# Patient Record
Sex: FEMALE | Race: WHITE | Employment: FULL TIME
[De-identification: names, ages, dates, MRNs, and addresses within clinical notes are randomized per-mention and may not be internally consistent; named-entity substitution may affect disease eponyms.]

---

## 2017-10-29 ENCOUNTER — HEALTH MAINTENANCE LETTER (OUTPATIENT)
Age: 32
End: 2017-10-29

## 2020-07-07 ENCOUNTER — OFFICE VISIT (OUTPATIENT)
Dept: FAMILY MEDICINE | Facility: CLINIC | Age: 35
End: 2020-07-07
Payer: COMMERCIAL

## 2020-07-07 VITALS
DIASTOLIC BLOOD PRESSURE: 80 MMHG | OXYGEN SATURATION: 99 % | TEMPERATURE: 98.4 F | HEART RATE: 61 BPM | SYSTOLIC BLOOD PRESSURE: 124 MMHG

## 2020-07-07 DIAGNOSIS — H93.8X2 POPPING OF LEFT EAR: ICD-10-CM

## 2020-07-07 DIAGNOSIS — H93.8X2 PRESSURE SENSATION IN LEFT EAR: ICD-10-CM

## 2020-07-07 DIAGNOSIS — H66.002 NON-RECURRENT ACUTE SUPPURATIVE OTITIS MEDIA OF LEFT EAR WITHOUT SPONTANEOUS RUPTURE OF TYMPANIC MEMBRANE: ICD-10-CM

## 2020-07-07 DIAGNOSIS — H92.02 LEFT EAR PAIN: Primary | ICD-10-CM

## 2020-07-07 RX ORDER — CEFDINIR 300 MG/1
300 CAPSULE ORAL 2 TIMES DAILY
Qty: 10 CAPSULE | Refills: 0 | Status: SHIPPED | OUTPATIENT
Start: 2020-07-07 | End: 2020-08-11

## 2020-07-07 RX ORDER — NEOMYCIN SULFATE, POLYMYXIN B SULFATE, HYDROCORTISONE 3.5; 10000; 1 MG/ML; [USP'U]/ML; MG/ML
2 SOLUTION/ DROPS AURICULAR (OTIC) 4 TIMES DAILY
Qty: 10 ML | Refills: 0 | Status: SHIPPED | OUTPATIENT
Start: 2020-07-07 | End: 2020-08-11

## 2020-07-07 ASSESSMENT — PAIN SCALES - GENERAL: PAINLEVEL: MODERATE PAIN (4)

## 2020-07-07 NOTE — PROGRESS NOTES
HPI       Nanette Estrada is a 35 year old woman who presents with left ear pain, popping and pressure. This started 3-4 days ago. Nanette has tried analgesia and just time to pass and her symptoms have not improved.  She presents for exam and evaluation.   Chief Complaint   Patient presents with     Ear Problem     Discuss popping and pain on L ear.      Problem, Medication and Allergy Lists were reviewed and updated if needed.    Patient is an established patient of this clinic.         Review of Systems:   Review of Systems     Constitutional:  Negative for fever, chills and fatigue.   HENT:  Positive for ear pain.                Physical Exam:     Vitals:    07/07/20 1009   BP: 124/80   Pulse: 61   Temp: 98.4  F (36.9  C)   TempSrc: Oral   SpO2: 99%     There is no height or weight on file to calculate BMI.  Vitals were reviewed and were normal     Physical Exam  Constitutional:       Appearance: Normal appearance.   HENT:      Head: Normocephalic.      Right Ear: Hearing, tympanic membrane, ear canal and external ear normal.      Left Ear: Hearing and external ear normal. Drainage, swelling and tenderness present. Tympanic membrane is erythematous.   Musculoskeletal: Normal range of motion.   Skin:     General: Skin is warm and dry.   Neurological:      General: No focal deficit present.      Mental Status: She is alert and oriented to person, place, and time.   Psychiatric:         Mood and Affect: Mood normal.         Behavior: Behavior normal.         Results:      Results from the last 24 hoursNo results found for this or any previous visit (from the past 24 hour(s)).    Assessment and Plan     1. Left ear pain      2. Popping of left ear      3. Pressure sensation in left ear      4. Non-recurrent acute suppurative otitis media of left ear without spontaneous rupture of tympanic membrane    - cefdinir (OMNICEF) 300 MG capsule; Take 1 capsule (300 mg) by mouth 2 times daily for 5 days  Dispense: 10  capsule; Refill: 0  - neomycin-polymyxin-hydrocortisone (CORTISPORIN) 3.5-80475-7 otic solution; Place 2 drops Into the left ear 4 times daily  Dispense: 10 mL; Refill: 0      There are no discontinued medications.    First, Nanette will start on oral Omnicef for a likely left otitis. She will also use ear drops for her left ear. She will return with worsening or persisting symptoms. Options for treatment and follow-up care were reviewed with the patient. Nanette Estrada  engaged in the decision making process and verbalized understanding of the options discussed and agreed with the final plan.  Jessica Vail, APRN, CNP

## 2020-07-07 NOTE — NURSING NOTE
Chief Complaint   Patient presents with     Ear Problem     Discuss popping and pain on L ear.      Depression Response    Patient completed the PHQ-9 assessment for depression and scored >9? No  Question 9 on the PHQ-9 was positive for suicidality? No    I personally notified the following: visit provider     DIPTI Lester 10:12 AM  7/7/2020

## 2020-07-07 NOTE — PATIENT INSTRUCTIONS

## 2020-07-13 ASSESSMENT — ENCOUNTER SYMPTOMS
FEVER: 0
FATIGUE: 0
CHILLS: 0

## 2020-08-11 ENCOUNTER — OFFICE VISIT (OUTPATIENT)
Dept: FAMILY MEDICINE | Facility: CLINIC | Age: 35
End: 2020-08-11
Payer: COMMERCIAL

## 2020-08-11 DIAGNOSIS — G47.09 OTHER INSOMNIA: ICD-10-CM

## 2020-08-11 DIAGNOSIS — Z11.51 SPECIAL SCREENING EXAMINATION FOR HUMAN PAPILLOMAVIRUS (HPV): ICD-10-CM

## 2020-08-11 DIAGNOSIS — Z00.00 ANNUAL PHYSICAL EXAM: Primary | ICD-10-CM

## 2020-08-11 RX ORDER — LANOLIN ALCOHOL/MO/W.PET/CERES
3 CREAM (GRAM) TOPICAL
Qty: 90 TABLET | Refills: 3 | COMMUNITY
Start: 2020-08-11

## 2020-08-11 ASSESSMENT — ANXIETY QUESTIONNAIRES
7. FEELING AFRAID AS IF SOMETHING AWFUL MIGHT HAPPEN: NOT AT ALL
IF YOU CHECKED OFF ANY PROBLEMS ON THIS QUESTIONNAIRE, HOW DIFFICULT HAVE THESE PROBLEMS MADE IT FOR YOU TO DO YOUR WORK, TAKE CARE OF THINGS AT HOME, OR GET ALONG WITH OTHER PEOPLE: NOT DIFFICULT AT ALL
GAD7 TOTAL SCORE: 1
3. WORRYING TOO MUCH ABOUT DIFFERENT THINGS: NOT AT ALL
1. FEELING NERVOUS, ANXIOUS, OR ON EDGE: SEVERAL DAYS
2. NOT BEING ABLE TO STOP OR CONTROL WORRYING: NOT AT ALL
5. BEING SO RESTLESS THAT IT IS HARD TO SIT STILL: NOT AT ALL
6. BECOMING EASILY ANNOYED OR IRRITABLE: NOT AT ALL

## 2020-08-11 ASSESSMENT — PAIN SCALES - GENERAL: PAINLEVEL: NO PAIN (0)

## 2020-08-11 ASSESSMENT — PATIENT HEALTH QUESTIONNAIRE - PHQ9
SUM OF ALL RESPONSES TO PHQ QUESTIONS 1-9: 0
5. POOR APPETITE OR OVEREATING: NOT AT ALL

## 2020-08-11 NOTE — NURSING NOTE
Chief Complaint   Patient presents with     Physical     Pt is here for a physical.     Depression Response    Patient completed the PHQ-9 assessment for depression and scored >9? No  Question 9 on the PHQ-9 was positive for suicidality? No    I personally notified the following: visit provider       DIPTI Lester 10:49 AM  8/11/2020

## 2020-08-11 NOTE — PROGRESS NOTES
Female Physical Note          HPI         Concerns today: No special concerns today.    Patient Active Problem List   Diagnosis     CARDIOVASCULAR SCREENING; LDL GOAL LESS THAN 160     No past medical history on file.    Previous Medical Care      No family history on file.         Review of Systems:     Review of Systems:  CONSTITUTIONAL: NEGATIVE for fever, chills, change in weight  INTEGUMENTARY/SKIN: NEGATIVE for worrisome rashes, moles or lesions  EYES: NEGATIVE for vision changes or irritation  ENT/MOUTH: NEGATIVE for ear, mouth and throat problems  RESP: NEGATIVE for significant cough or SOB  BREAST: NEGATIVE for masses, tenderness or discharge  CV: NEGATIVE for chest pain, palpitations or peripheral edema  GI: NEGATIVE for nausea, abdominal pain, heartburn, or change in bowel habits  : NEGATIVE for frequency, dysuria, or hematuria  MUSCULOSKELETAL: NEGATIVE for significant arthralgias or myalgia  NEURO: NEGATIVE for weakness, dizziness or paresthesias  ENDOCRINE: NEGATIVE for temperature intolerance, skin/hair changes  HEME/ALLERGY: NEGATIVE for bleeding problems  PSYCHIATRIC: NEGATIVE for changes in mood or affect  Sleep:   Do you snore most or the night (as reported by a family member)? No  Do you feel sleepy or extremely tired during most of the day? No         Social History     Social History     Socioeconomic History     Marital status:      Spouse name: Not on file     Number of children: 0     Years of education: Not on file     Highest education level: Not on file   Occupational History     Occupation:    Social Needs     Financial resource strain: Not on file     Food insecurity     Worry: Not on file     Inability: Not on file     Transportation needs     Medical: Not on file     Non-medical: Not on file   Tobacco Use     Smoking status: Never Smoker     Smokeless tobacco: Never Used   Substance and Sexual Activity     Alcohol use: No     Drug use: No     Sexual activity:  Yes     Partners: Male     Birth control/protection: None   Lifestyle     Physical activity     Days per week: Not on file     Minutes per session: Not on file     Stress: Not on file   Relationships     Social connections     Talks on phone: Not on file     Gets together: Not on file     Attends [employer] service: Not on file     Active member of club or organization: Not on file     Attends meetings of clubs or organizations: Not on file     Relationship status: Not on file     Intimate partner violence     Fear of current or ex partner: Not on file     Emotionally abused: Not on file     Physically abused: Not on file     Forced sexual activity: Not on file   Other Topics Concern     Not on file   Social History Narrative     Not on file       Marital Status:Partnered  Who lives in your household? Zeeshan, her significant other    Has anyone hurt you physically, for example by pushing, hitting, slapping or kicking you or forcing you to have sex? Denies  Do you feel threatened or controlled by a partner, ex-partner or anyone in your life? Denies    Sexual Health     Sexual concerns: No   STI History: Neg  Pregnancy History: No obstetric history on file.  LMP No LMP recorded. about 3 weeks ago, lasting 7 days  Last Pap Smear Date: 2.5 years ago, normal  Abnormal Pap History: None    Recommended Screening   Mom and sister both have breast cancer- Nanette is working with Insurance on getting a mammogram covered.        Physical Exam:     Vitals: BP (P) 114/72   Pulse (P) 60   Temp (P) 98.1  F (36.7  C) (Oral)   Wt (P) 59.5 kg (131 lb 3.2 oz)   SpO2 (P) 98%   BMI (P) 20.86 kg/m    BMI= Body mass index is 20.86 kg/m  (pended).   GENERAL: healthy, alert and no distress  EYES: Eyes grossly normal to inspection, extraocular movements - intact, and PERRL  HENT: ear canals- normal; TMs- normal; Nose- normal; Mouth- no ulcers, no lesions  NECK: no tenderness, no adenopathy, no asymmetry, no masses, no stiffness; thyroid-  normal to palpation  RESP: lungs clear to auscultation - no rales, no rhonchi, no wheezes  BREAST: no masses, no tenderness, no nipple discharge, no palpable axillary masses or adenopathy  CV: regular rates and rhythm, normal S1 S2, no S3 or S4 and no murmur, no click or rub -  ABDOMEN: soft, no tenderness, no  hepatosplenomegaly, no masses, normal bowel sounds  MS: extremities- no gross deformities noted, no edema  SKIN: no suspicious lesions, no rashes  NEURO: strength and tone- normal, sensory exam- grossly normal, mentation- intact, speech- normal, reflexes- symmetric  BACK: no CVA tenderness, no paralumbar tenderness  - female: cervix- normal, adnexae- normal; uterus- normal, no masses, no discharge  RECTAL- female: no masses, no hemorrhoids  PSYCH: Alert and oriented times 3; speech- coherent , normal rate and volume; able to articulate logical thoughts, able to abstract reason, no tangential thoughts, no hallucinations or delusions, affect- normal  LYMPHATICS: ant. cervical- normal, post. cervical- normal, axillary- normal, supraclavicular- normal, inguinal- normal      Assessment and Plan      Nanette was seen today for physical.    Diagnoses and all orders for this visit:    Annual physical exam  -     Pap imaged thin layer screen with HPV - recommended age 30 - 65 years (select HPV order below)    Other insomnia    Special screening examination for human papillomavirus (HPV)  -     HPV High Risk Types DNA Cervical        1. Health Care Maintenance: Normal Physical Exam      1. Routine follow up in one year.  2.Contraception: Not needed.    Options for treatment and follow-up care were reviewed with the patient . Nanette Estarda and/or guardian engaged in the decision making process and verbalized understanding of the options discussed and agreed with the final plan.    Jessica Vail, APRN, CNP

## 2020-08-12 ASSESSMENT — ANXIETY QUESTIONNAIRES: GAD7 TOTAL SCORE: 1

## 2020-08-12 NOTE — PATIENT INSTRUCTIONS

## 2020-08-14 LAB
COPATH REPORT: NORMAL
PAP: NORMAL

## 2020-08-17 LAB
FINAL DIAGNOSIS: NORMAL
HPV HR 12 DNA CVX QL NAA+PROBE: NEGATIVE
HPV16 DNA SPEC QL NAA+PROBE: NEGATIVE
HPV18 DNA SPEC QL NAA+PROBE: NEGATIVE
SPECIMEN DESCRIPTION: NORMAL
SPECIMEN SOURCE CVX/VAG CYTO: NORMAL

## 2021-02-22 ENCOUNTER — OFFICE VISIT (OUTPATIENT)
Dept: FAMILY MEDICINE | Facility: CLINIC | Age: 36
End: 2021-02-22
Payer: COMMERCIAL

## 2021-02-22 VITALS
HEIGHT: 67 IN | HEART RATE: 56 BPM | OXYGEN SATURATION: 100 % | WEIGHT: 135.2 LBS | SYSTOLIC BLOOD PRESSURE: 139 MMHG | BODY MASS INDEX: 21.22 KG/M2 | DIASTOLIC BLOOD PRESSURE: 87 MMHG

## 2021-02-22 DIAGNOSIS — R42 VERTIGO: Primary | ICD-10-CM

## 2021-02-22 PROCEDURE — 99213 OFFICE O/P EST LOW 20 MIN: CPT | Performed by: PHYSICIAN ASSISTANT

## 2021-02-22 RX ORDER — MECLIZINE HYDROCHLORIDE 25 MG/1
25 TABLET ORAL 3 TIMES DAILY PRN
Qty: 10 TABLET | Refills: 0 | Status: SHIPPED | OUTPATIENT
Start: 2021-02-22

## 2021-02-22 ASSESSMENT — MIFFLIN-ST. JEOR: SCORE: 1337.95

## 2021-02-22 NOTE — LETTER
[facility]  [address]  [place] [address]  Phone: [phone]    February 22, 2021        Nanette Estrada  [address] [facility] [address]          To whom it may concern:    RE: Nanette Estrada    Patient was seen and treated today at our clinic and missed work.    Please contact me for questions or concerns.      Sincerely,        Bruce Wiggins PA-C

## 2021-02-22 NOTE — PROGRESS NOTES
"    Assessment & Plan     Vertigo  History and exam lean towards benign positional vertigo.  At home epley exercises recommended, can take antivert for symptomatic treatment.  Follow up is not better in a couple of days.  - meclizine (ANTIVERT) 25 MG tablet; Take 1 tablet (25 mg) by mouth 3 times daily as needed for dizziness                 No follow-ups on file.    NAY [facility]    Christian Fitzpatrick is a 35 year old who presents for the following health issues     HPI       Dizziness  Onset/Duration: since Thursday; 4 days ago  Description:   Do you feel faint: no  Does it feel like the surroundings (bed, room) are moving: no  Unsteady/off balance: YES- at times  Have you passed out or fallen: no  Intensity: moderate  Progression of Symptoms: waxing and waning- started to feel like things were getting better, but now worsening.  Accompanying Signs & Symptoms:  Heart palpitations or chest pain: no  Nausea, vomiting: YES  Weakness or lack of coordination in arms or legs: YES, but very tired  Vision or speech changes: no  Numbness or tingling: no  Ringing in ears (Tinnitus): YES  Hearing Loss: no  History:   Head trauma/concussion history: no  Previous similar symptoms: no; but did have double ear infection in the past that had sx.  Recent bleeding history: no  Any new medications (BP?): no  Precipitating factors:   Worse with activity: YES  Worse with head movement: YES  Alleviating factors:   Does staying in a fixed position give relief: YES  Therapies tried and outcome: Took a Dramamine but no relief yet.        Review of Systems   Constitutional, HEENT, cardiovascular, pulmonary, gi and gu systems are negative, except as otherwise noted.      Objective    /87   Pulse 56   Ht 1.689 m (5' 6.5\")   Wt 61.3 kg (135 lb 3.2 oz)   SpO2 100%   BMI 21.49 kg/m    Body mass index is 21.49 kg/m .  Physical Exam   GENERAL: alert and no distress  EYES: Eyes grossly " normal to inspection, PERRL and EOMI  NECK: no adenopathy, no asymmetry, masses, or scars, thyroid normal to palpation and no carotid bruits  RESP: lungs clear to auscultation - no rales, rhonchi or wheezes  CV: regular rate and rhythm, normal S1 S2, no S3 or S4, no murmur, click or rub, no peripheral edema and peripheral pulses strong  NEURO: Normal strength and tone, sensory exam grossly normal, mentation intact, cranial nerves 2-12 intact and reproduction of symptoms with laying down and head turning.